# Patient Record
Sex: FEMALE | Race: WHITE | NOT HISPANIC OR LATINO | Employment: UNEMPLOYED | URBAN - METROPOLITAN AREA
[De-identification: names, ages, dates, MRNs, and addresses within clinical notes are randomized per-mention and may not be internally consistent; named-entity substitution may affect disease eponyms.]

---

## 2023-05-11 DIAGNOSIS — R51.9 FREQUENT HEADACHES: Primary | ICD-10-CM

## 2023-05-11 DIAGNOSIS — R41.3 MEMORY DEFICITS: ICD-10-CM

## 2023-05-12 DIAGNOSIS — R41.3 MEMORY DEFICIT: ICD-10-CM

## 2023-05-12 DIAGNOSIS — R51.9 FREQUENT HEADACHES: Primary | ICD-10-CM

## 2023-09-07 ENCOUNTER — OFFICE VISIT (OUTPATIENT)
Dept: NEUROLOGY | Facility: CLINIC | Age: 53
End: 2023-09-07
Payer: MEDICAID

## 2023-09-07 VITALS
WEIGHT: 138 LBS | OXYGEN SATURATION: 99 % | DIASTOLIC BLOOD PRESSURE: 84 MMHG | BODY MASS INDEX: 26.06 KG/M2 | SYSTOLIC BLOOD PRESSURE: 131 MMHG | HEIGHT: 61 IN | HEART RATE: 85 BPM

## 2023-09-07 DIAGNOSIS — R41.3 MEMORY DEFICIT: ICD-10-CM

## 2023-09-07 DIAGNOSIS — R51.9 FREQUENT HEADACHES: ICD-10-CM

## 2023-09-07 DIAGNOSIS — R40.20 LOSS OF CONSCIOUSNESS: Primary | ICD-10-CM

## 2023-09-07 PROCEDURE — 4010F ACE/ARB THERAPY RXD/TAKEN: CPT | Mod: CPTII,,, | Performed by: NURSE PRACTITIONER

## 2023-09-07 PROCEDURE — 3075F SYST BP GE 130 - 139MM HG: CPT | Mod: CPTII,,, | Performed by: NURSE PRACTITIONER

## 2023-09-07 PROCEDURE — 3008F BODY MASS INDEX DOCD: CPT | Mod: CPTII,,, | Performed by: NURSE PRACTITIONER

## 2023-09-07 PROCEDURE — 4010F PR ACE/ARB THEARPY RXD/TAKEN: ICD-10-PCS | Mod: CPTII,,, | Performed by: NURSE PRACTITIONER

## 2023-09-07 PROCEDURE — 3079F PR MOST RECENT DIASTOLIC BLOOD PRESSURE 80-89 MM HG: ICD-10-PCS | Mod: CPTII,,, | Performed by: NURSE PRACTITIONER

## 2023-09-07 PROCEDURE — 99213 OFFICE O/P EST LOW 20 MIN: CPT | Mod: PBBFAC | Performed by: NURSE PRACTITIONER

## 2023-09-07 PROCEDURE — 99205 PR OFFICE/OUTPT VISIT, NEW, LEVL V, 60-74 MIN: ICD-10-PCS | Mod: S$PBB,,, | Performed by: NURSE PRACTITIONER

## 2023-09-07 PROCEDURE — 1160F PR REVIEW ALL MEDS BY PRESCRIBER/CLIN PHARMACIST DOCUMENTED: ICD-10-PCS | Mod: CPTII,,, | Performed by: NURSE PRACTITIONER

## 2023-09-07 PROCEDURE — 1160F RVW MEDS BY RX/DR IN RCRD: CPT | Mod: CPTII,,, | Performed by: NURSE PRACTITIONER

## 2023-09-07 PROCEDURE — 1159F PR MEDICATION LIST DOCUMENTED IN MEDICAL RECORD: ICD-10-PCS | Mod: CPTII,,, | Performed by: NURSE PRACTITIONER

## 2023-09-07 PROCEDURE — 99205 OFFICE O/P NEW HI 60 MIN: CPT | Mod: S$PBB,,, | Performed by: NURSE PRACTITIONER

## 2023-09-07 PROCEDURE — 3079F DIAST BP 80-89 MM HG: CPT | Mod: CPTII,,, | Performed by: NURSE PRACTITIONER

## 2023-09-07 PROCEDURE — 3008F PR BODY MASS INDEX (BMI) DOCUMENTED: ICD-10-PCS | Mod: CPTII,,, | Performed by: NURSE PRACTITIONER

## 2023-09-07 PROCEDURE — 1159F MED LIST DOCD IN RCRD: CPT | Mod: CPTII,,, | Performed by: NURSE PRACTITIONER

## 2023-09-07 PROCEDURE — 3075F PR MOST RECENT SYSTOLIC BLOOD PRESS GE 130-139MM HG: ICD-10-PCS | Mod: CPTII,,, | Performed by: NURSE PRACTITIONER

## 2023-09-07 RX ORDER — SUMATRIPTAN SUCCINATE 25 MG/1
25 TABLET ORAL 2 TIMES DAILY PRN
Qty: 12 TABLET | Refills: 1 | Status: SHIPPED | OUTPATIENT
Start: 2023-09-07 | End: 2023-12-11

## 2023-09-07 RX ORDER — TIZANIDINE 2 MG/1
4 TABLET ORAL 2 TIMES DAILY PRN
Qty: 30 TABLET | Refills: 0 | Status: SHIPPED | OUTPATIENT
Start: 2023-09-07 | End: 2023-09-17

## 2023-09-07 RX ORDER — LOSARTAN POTASSIUM 50 MG/1
50 TABLET ORAL DAILY
COMMUNITY
Start: 2023-08-22 | End: 2023-12-11

## 2023-09-07 RX ORDER — GEMFIBROZIL 600 MG/1
600 TABLET, FILM COATED ORAL 2 TIMES DAILY
COMMUNITY
Start: 2023-08-22

## 2023-09-07 RX ORDER — AMITRIPTYLINE HYDROCHLORIDE 75 MG/1
75 TABLET ORAL NIGHTLY
COMMUNITY
Start: 2023-08-15

## 2023-09-07 NOTE — PROGRESS NOTES
"[facility] Neurology Initial Office Visit Note    Initial Visit Date: 09/07/2023  Last Visit Date:   Current Visit Date:  09/07/2023    Chief Complaint:     Chief Complaint   Patient presents with    Headache     Pt reports migraines have been present since 11/2022. Described as heavy, along with the sudden urge to sleep. Located behind eyes, or back of head. Tried and failed Elavil. 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 Changed glasses Rx thinking it could have been the cause. Ophtho agrees to f/u w/ Neuro.     History of Present Illness:      This is 53 y.o. female with history of sleep disturbance, scoliosis, sciatica, anxiety, who is referred headache disorder. Has also had issues w/zoning out while talking to family and began to talk about something different, only lasted a few minutes then joins conversation. No personal or family hx of seizures.    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 Back of head feels "heavy" prior to HA    Headache Description: back of head feels heavy, behind eyes, may radiate to temporal area, pounding/throbbing/pinching, denies N/V, admits to photophobia, worsened with activity, sleeps it off, does not take anything, will last a few hours. Last HA 2 weeks ago, improved with new glasses    Frequency: 2-3 headache days per month.     Provocation Factors: bending neck forward; sometimes lack of sleep    Risk Factors  - Family history of headache disorder: No  - History of focal CNS lesions: No  - History of CNS infections: No  - History head trauma: No  - History of underlying mood disorder: No  - History of sleep disorder: Yes insomnia; on Elavil  - Recreational drug use: No  - Tobacco use: No  - Alcohol use: No  - Weight " fluctuation: No  - Isotretinoin or Tetracycline use:  No  - Family planning and contraceptive use: No    Medications:     Current Prophylactic  Losartan 50mg daily - not effective for HA  Amitriptyline 50mg QHS - not effective for HA    Current Abortive  none    Prior Prophylactic  none    Prior Abortive  none    Devices:     - VNS:  - TNS  - TMS:     Procedures:     - Botox:  - PSG block:   - Occipital nerve block:     Labs:     No results found for this or any previous visit.    Studies:     - MRI Brain:   - MRA Head w/o Aman:   - MRV Head w/o Aman:   - NCHCT: 12/9/2022 - no acute intracranial findings  - Lumbar Puncture:    Review of Systems:     ROS    Physical Exams:     Vitals:    09/07/23 1315   BP: 131/84   Pulse: 85     Physical Exam  Constitutional:       Appearance: Normal appearance.   HENT:      Right Ear: External ear normal.      Left Ear: External ear normal.      Mouth/Throat:      Mouth: Mucous membranes are moist.      Pharynx: Oropharynx is clear.   Eyes:      Pupils: Pupils are equal, round, and reactive to light.   Cardiovascular:      Rate and Rhythm: Normal rate.      Pulses: Normal pulses.   Abdominal:      General: There is no distension.      Tenderness: There is no guarding.   Musculoskeletal:         General: Normal range of motion.      Cervical back: Normal range of motion.   Skin:     General: Skin is warm.      Findings: No lesion or rash.   Neurological:      General: No focal deficit present.      Mental Status: She is alert.   Psychiatric:         Mood and Affect: Mood normal.     Comprehensive Neurological Exam:  Mental Status: Alert Oriented to Self, Date, and Place. Naming, repetition, reading, and writing wnl. Comprehension wnl. No dysarthria.   CN II - XII: XAVIER, No APD, Fundus wnl OU, VA grossly intact to finger counting at 6 ft, VFFC, No ptosis OU, EOMI without nystagmus LT/Temp symmetric in CN V1-3 distribution, Hearing grossly intact, Face Symmetric, Tongue and Uvula  "midline, Trapezius symmetric bilateral.   Motor: tone and bulk wnl throughout, no abnormal involuntary or voluntary movements, 5/5 to confrontation, Fine finger movements wnl b/l, No pronator drift.   Sensory: LT, Proprioception, Vibration, PP, Temp symmetric. No sensory simultagnosia.   Reflexes: 2+ throughout, plantar reflexes downward bilateral.   Cerebellar: FNF wnl b/l, RAHM wnl b/l  Romberg: Negative  Gait: normal. Heel Gait, Toe Gait, Tandem Gait wnl.     Assessment:     This is 53 y.o. female with history of sleep disorder and anxiety, who is referred headache disorder. Also c/o issues with "zoning out" on occasion. Admits to neck tension while painting. Is aware when HA beginning as it starts in occipital area of head and may radiate to frontal area like a band. May last a few hours. Does not take anything OTC. Both started in November, denies any exacerbating factors.    Problem List Items Addressed This Visit    None  Visit Diagnoses       Loss of consciousness    -  Primary    Relevant Orders    EEG    Frequent headaches        Relevant Medications    sumatriptan (IMITREX) 25 MG Tab    tiZANidine (ZANAFLEX) 2 MG tablet    Memory deficit              Plan:     [] rEEG for LOC  [] start tizanidine 2mg BID PRN tension  [] sumatriptan 25mg PO BID PRN at onset of headache    RTC 3 mths    Headache education provided: good sleep hygiene and 7 hours of sleep per night, stress management, medication overuse education provided. Using more 3 OTC per week may worsen headaches, high intensity interval training has shown to reduce headache frequency. Low carb, high protein has shown to reduce headache frequency. Patient is instructed in keep headache diary.     I have explained the treatment plan, diagnosis, and prognosis to patient. All questions are answered to the best of my knowledge.     Face to face time 60 minutes, including documentation, chart review, counseling, education, review of test results, relevant " medical records, and coordination of care.   09/07/2023

## 2023-09-26 ENCOUNTER — PROCEDURE VISIT (OUTPATIENT)
Dept: NEUROLOGY | Facility: HOSPITAL | Age: 53
End: 2023-09-26
Attending: NURSE PRACTITIONER
Payer: MEDICAID

## 2023-09-26 DIAGNOSIS — R40.20 LOSS OF CONSCIOUSNESS: ICD-10-CM

## 2023-09-26 PROCEDURE — 95816 EEG AWAKE AND DROWSY: CPT

## 2023-09-26 PROCEDURE — 95819 PR EEG,W/AWAKE & ASLEEP RECORD: ICD-10-PCS | Mod: 26,,, | Performed by: PSYCHIATRY & NEUROLOGY

## 2023-09-26 PROCEDURE — 95819 EEG AWAKE AND ASLEEP: CPT | Mod: 26,,, | Performed by: PSYCHIATRY & NEUROLOGY

## 2023-09-26 NOTE — PROCEDURES
ROUTINE EEG    Date/Time: 9/26/2023 12:30 PM    Performed by: Kadie Park MD  Authorized by: Abbey Mcgraw ANP      Indication: Syncope    Clinical Information: This is a 53 year old female with lost of awareness events.     Anticonvulsants: None    Recording Condition: This is a routine electroencephalogram performed in outpatient settings using [employer] software. Electroencephalogram leads were placed using a 10-20 placement system. The electroencephalogram is monitored in real time by a technologist and is of good technical quality for review.     Technique: Electroencephalogram leads were placed using a 10-20 placement system and electrode impedance was maintained below 10KOhms. Truong and seizure detection computer program was used for digital EEG analysis throughout the monitoring period, to screen the EEG in real time and pascual the data file with pointers to electrographic seizure and interictal discharges. Hyperventilation was performed and Photic stimulation was performed.     Description:   Background Symmetry- Symmetric  Frequency - Beta and Alpha  Voltage - Normal   Continuity - Continuous  Anterior to Posterior Gradient - Present  Posterior Dominant Rhythm - 7 Hz   Reactivity - Unclear  State Changes - Present with normal sleep stage N2 transients  Breach Artifact - Absent    Cyclic Alternating Pattern of Encephalopathy (CAPE) - Absent  Sporadic Epileptiform Discharges - Absent  Rhythmic or Periodic Patterns (RPPs) - Absent    Electroclinical Seizures (ECSz): Absent  Electroclinical Status Epilepticus (ECSE): Absent  Electrographical Seizures (Esz): Absent    Briefly Potentially Ictal Rhythmic Discharges (BIRDs): Absent  Ictal-Interictal Continuum (IIC): Absent    Conclusion: This is a normal routine awake and asleep EEG.     Impression: This is a normal routine awake and asleep EEG. The possibility that the patient may have a potentially epileptogenic focus cannot be entirely excluded. There is  however no ongoing seizure activity, nor is there any evidence of status epilepticus in this study. Clinical correlation is advised.

## 2023-09-27 ENCOUNTER — TELEPHONE (OUTPATIENT)
Dept: NEUROLOGY | Facility: CLINIC | Age: 53
End: 2023-09-27
Payer: MEDICAID

## 2023-09-27 DIAGNOSIS — G43.701 CHRONIC MIGRAINE WITHOUT AURA WITH STATUS MIGRAINOSUS, NOT INTRACTABLE: Primary | ICD-10-CM

## 2023-09-27 RX ORDER — PROPRANOLOL HYDROCHLORIDE 10 MG/1
10 TABLET ORAL DAILY
Qty: 30 TABLET | Refills: 2 | Status: SHIPPED | OUTPATIENT
Start: 2023-09-27 | End: 2023-12-11

## 2023-09-27 NOTE — TELEPHONE ENCOUNTER
Called Pt and discussed EEG results. Denies any further black out spells. However notes increased in HA to 2-3/week due to increased stress and lack of sleep. Currently taking Elavil 75mg nightly, tizanidine more effective than sumatriptan. Will start propranolol 10mg daily. Is to check BP daily, call office for dizziness or return of black out spells and will order ambulatory EEG. Pt verbalized understanding.

## 2023-12-11 ENCOUNTER — OFFICE VISIT (OUTPATIENT)
Dept: NEUROLOGY | Facility: CLINIC | Age: 53
End: 2023-12-11
Payer: MEDICAID

## 2023-12-11 DIAGNOSIS — R51.9 FREQUENT HEADACHES: Primary | ICD-10-CM

## 2023-12-11 DIAGNOSIS — R40.20 LOSS OF CONSCIOUSNESS: ICD-10-CM

## 2023-12-11 PROCEDURE — 99214 PR OFFICE/OUTPT VISIT, EST, LEVL IV, 30-39 MIN: ICD-10-PCS | Mod: 95,,, | Performed by: NURSE PRACTITIONER

## 2023-12-11 PROCEDURE — 1159F PR MEDICATION LIST DOCUMENTED IN MEDICAL RECORD: ICD-10-PCS | Mod: CPTII,95,, | Performed by: NURSE PRACTITIONER

## 2023-12-11 PROCEDURE — 99214 OFFICE O/P EST MOD 30 MIN: CPT | Mod: 95,,, | Performed by: NURSE PRACTITIONER

## 2023-12-11 PROCEDURE — 4010F PR ACE/ARB THEARPY RXD/TAKEN: ICD-10-PCS | Mod: CPTII,95,, | Performed by: NURSE PRACTITIONER

## 2023-12-11 PROCEDURE — 1159F MED LIST DOCD IN RCRD: CPT | Mod: CPTII,95,, | Performed by: NURSE PRACTITIONER

## 2023-12-11 PROCEDURE — 1160F RVW MEDS BY RX/DR IN RCRD: CPT | Mod: CPTII,95,, | Performed by: NURSE PRACTITIONER

## 2023-12-11 PROCEDURE — 4010F ACE/ARB THERAPY RXD/TAKEN: CPT | Mod: CPTII,95,, | Performed by: NURSE PRACTITIONER

## 2023-12-11 PROCEDURE — 1160F PR REVIEW ALL MEDS BY PRESCRIBER/CLIN PHARMACIST DOCUMENTED: ICD-10-PCS | Mod: CPTII,95,, | Performed by: NURSE PRACTITIONER

## 2023-12-11 RX ORDER — SUMATRIPTAN 50 MG/1
50 TABLET, FILM COATED ORAL ONCE
COMMUNITY
Start: 2023-09-07 | End: 2023-12-11

## 2023-12-11 RX ORDER — LOSARTAN POTASSIUM AND HYDROCHLOROTHIAZIDE 12.5; 5 MG/1; MG/1
1 TABLET ORAL 2 TIMES DAILY
COMMUNITY
Start: 2023-12-06

## 2023-12-11 NOTE — PROGRESS NOTES
"[facility] Neurology Follow Up Office Visit Note    Initial Visit Date: 09/07/2023  Last Visit Date:   Current Visit Date:  12/11/2023    Chief Complaint:     Chief Complaint   Patient presents with    Headache     Pt states the headaches are much better. 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     History of Present Illness:      This is 53 y.o. female with history of sleep disturbance, scoliosis, sciatica, anxiety, who was referred headache disorder. Has also had issues w/zoning out while talking to family and began to talk about something different, only lasted a few minutes then joins conversation. No personal or family hx of seizures. During last visit, a routine EEG was ordered for LOC. Sumatriptan 25mg PO BID PRN and tizanidine 2mg PO BID PRN were initiated.    Today, Pt states headaches have improved. A slow gas leak was repaired. Rarely has headaches. Last headache a month ago. Denies any further episodes of LOC. No longer requires sumatriptan or tizanidine.    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 Back of head feels "heavy" prior to HA    Headache Description: back of head feels heavy, behind eyes, may radiate to temporal area, pounding/throbbing/pinching, denies N/V, admits to photophobia, worsened with activity, sleeps it off, does not take anything, will last a few hours. Last HA 2 weeks ago, improved with new glasses    Frequency: 2-3 headache days per month.     Provocation Factors: bending neck forward; sometimes lack of sleep    Risk Factors  - Family history of headache disorder: No  - History of focal CNS lesions: No  - History of CNS infections: No  - History head trauma: No  - History of underlying mood disorder: No  - History of sleep disorder: Yes insomnia; " on Elavil  - Recreational drug use: No  - Tobacco use: No  - Alcohol use: No  - Weight fluctuation: No  - Isotretinoin or Tetracycline use:  No  - Family planning and contraceptive use: No    Medications:     Current Prophylactic  Losartan 50mg daily - not effective for HA  Amitriptyline 50mg QHS - not effective for HA    Current Abortive  none    Prior Prophylactic  none    Prior Abortive  none    Devices:     - VNS:  - TNS  - TMS:     Procedures:     - Botox:  - PSG block:   - Occipital nerve block:     Labs:     No results found for this or any previous visit.    Studies:     - MRI Brain:   - MRA Head w/o Aman:   - MRV Head w/o Aman:   - NCHCT: 12/9/2022 - no acute intracranial findings  - Lumbar Puncture:  - rEEG 9/26/2023: this is a normal routine awake and asleep EEG (read by Dr. Park)    Review of Systems:     ROS  As per HPI. All other systems negative  Physical Exams:     There were no vitals filed for this visit.    Physical Exam  Constitutional:       Appearance: Normal appearance.   HENT:      Right Ear: External ear normal.      Left Ear: External ear normal.      Mouth/Throat:      Mouth: Mucous membranes are moist.      Pharynx: Oropharynx is clear.   Eyes:      Extraocular Movements: Extraocular movements intact.   Pulmonary:      Effort: Pulmonary effort is normal.   Abdominal:      General: Abdomen is flat. There is no distension.      Tenderness: There is no guarding.   Musculoskeletal:         General: Normal range of motion.      Cervical back: Normal range of motion.   Neurological:      General: No focal deficit present.      Mental Status: She is alert.   Psychiatric:         Mood and Affect: Mood normal.     Comprehensive Neurological Exam:  Mental Status: Alert Oriented to Self, Date, and Place. Naming, repetition, reading, and writing wnl. Comprehension wnl. No dysarthria.   CN II - XII: No ptosis OU, EOMI without nystagmus, Hearing grossly intact, Face Symmetric, Tongue and Uvula midline,  Trapezius symmetric bilateral.   Motor: tone and bulk wnl throughout, no abnormal involuntary or voluntary movements, no satelliting w/orbiting, Fine finger movements wnl b/l, No pronator drift.   Sensory: unable to assess due to nature of visit  Reflexes: unable to assess due to nature of visit  Cerebellar: FNF wnl b/l  Romberg: Negative  Gait: normal, bilat arm swing intact    Assessment:     This is 53 y.o. female with history of sleep disorder and anxiety, who was referred headache disorder. Pt states a gas leak was discovered and since repaired. Denies LOC or severe HA since repair. Does not require sumatriptan, beta blocker or tizanidine.     Problem List Items Addressed This Visit          Neuro    Frequent headaches - Primary    Loss of consciousness     Plan:     [] d/c tizanidine 2mg BID PRN tension  [] d/c sumatriptan 25mg PO BID PRN at onset of headache  [] may schedule appt if headache return    RTC PRN    Headache education provided: good sleep hygiene and 7 hours of sleep per night, stress management, medication overuse education provided. Using more 3 OTC per week may worsen headaches, high intensity interval training has shown to reduce headache frequency. Low carb, high protein has shown to reduce headache frequency. Patient is instructed in keep headache diary.     I have explained the treatment plan, diagnosis, and prognosis to patient. All questions are answered to the best of my knowledge.     Face to face time 30 minutes, including documentation, chart review, counseling, education, review of test results, relevant medical records, and coordination of care.   12/11/2023

## 2025-02-10 ENCOUNTER — TELEPHONE (OUTPATIENT)
Dept: NEUROLOGY | Facility: CLINIC | Age: 55
End: 2025-02-10
Payer: MEDICAID

## 2025-02-10 NOTE — TELEPHONE ENCOUNTER
----- Message from Dee sent at 2/10/2025  1:37 PM CST -----  Regarding: please advise  Pt is in need of a appt. Pt stated that she is having pain on the left side of her face. Pt went to see ENT Dr and was told to get cher with Neurologist per pt. Pt can be reached at [phone]    Thank You

## 2025-03-19 ENCOUNTER — LAB VISIT (OUTPATIENT)
Dept: LAB | Facility: HOSPITAL | Age: 55
End: 2025-03-19
Attending: NURSE PRACTITIONER
Payer: MEDICAID

## 2025-03-19 ENCOUNTER — OFFICE VISIT (OUTPATIENT)
Dept: NEUROLOGY | Facility: CLINIC | Age: 55
End: 2025-03-19
Payer: MEDICAID

## 2025-03-19 ENCOUNTER — PATIENT MESSAGE (OUTPATIENT)
Dept: NEUROLOGY | Facility: CLINIC | Age: 55
End: 2025-03-19

## 2025-03-19 VITALS
HEIGHT: 61 IN | TEMPERATURE: 98 F | HEART RATE: 78 BPM | DIASTOLIC BLOOD PRESSURE: 80 MMHG | SYSTOLIC BLOOD PRESSURE: 128 MMHG | OXYGEN SATURATION: 98 % | BODY MASS INDEX: 28.1 KG/M2 | RESPIRATION RATE: 18 BRPM | WEIGHT: 148.81 LBS

## 2025-03-19 DIAGNOSIS — G50.0 TRIGEMINAL NEURALGIA OF LEFT SIDE OF FACE: Chronic | ICD-10-CM

## 2025-03-19 DIAGNOSIS — G50.0 TRIGEMINAL NEURALGIA OF LEFT SIDE OF FACE: Primary | Chronic | ICD-10-CM

## 2025-03-19 LAB
ALBUMIN SERPL-MCNC: 4 G/DL (ref 3.5–5)
ALBUMIN/GLOB SERPL: 1.1 RATIO (ref 1.1–2)
ALP SERPL-CCNC: 83 UNIT/L (ref 40–150)
ALT SERPL-CCNC: 54 UNIT/L (ref 0–55)
ANION GAP SERPL CALC-SCNC: 7 MEQ/L
AST SERPL-CCNC: 27 UNIT/L (ref 5–34)
BILIRUB SERPL-MCNC: 0.3 MG/DL
BUN SERPL-MCNC: 13.5 MG/DL (ref 9.8–20.1)
CALCIUM SERPL-MCNC: 8.8 MG/DL (ref 8.4–10.2)
CHLORIDE SERPL-SCNC: 104 MMOL/L (ref 98–107)
CO2 SERPL-SCNC: 29 MMOL/L (ref 22–29)
CREAT SERPL-MCNC: 0.75 MG/DL (ref 0.55–1.02)
CREAT/UREA NIT SERPL: 18
CRP SERPL-MCNC: 4.3 MG/L
ERYTHROCYTE [SEDIMENTATION RATE] IN BLOOD: 4 MM/HR (ref 0–20)
GFR SERPLBLD CREATININE-BSD FMLA CKD-EPI: >60 ML/MIN/1.73/M2
GLOBULIN SER-MCNC: 3.7 GM/DL (ref 2.4–3.5)
GLUCOSE SERPL-MCNC: 92 MG/DL (ref 74–100)
POTASSIUM SERPL-SCNC: 3.3 MMOL/L (ref 3.5–5.1)
PROT SERPL-MCNC: 7.7 GM/DL (ref 6.4–8.3)
SODIUM SERPL-SCNC: 140 MMOL/L (ref 136–145)

## 2025-03-19 PROCEDURE — 85652 RBC SED RATE AUTOMATED: CPT

## 2025-03-19 PROCEDURE — 86140 C-REACTIVE PROTEIN: CPT

## 2025-03-19 PROCEDURE — 80053 COMPREHEN METABOLIC PANEL: CPT

## 2025-03-19 PROCEDURE — 36415 COLL VENOUS BLD VENIPUNCTURE: CPT

## 2025-03-19 PROCEDURE — 99213 OFFICE O/P EST LOW 20 MIN: CPT | Mod: PBBFAC | Performed by: NURSE PRACTITIONER

## 2025-03-19 RX ORDER — EZETIMIBE 10 MG/1
10 TABLET ORAL
COMMUNITY
Start: 2025-02-26

## 2025-03-19 RX ORDER — DEXAMETHASONE 4 MG/1
4 TABLET ORAL EVERY 12 HOURS
Qty: 10 TABLET | Refills: 0 | Status: SHIPPED | OUTPATIENT
Start: 2025-03-19 | End: 2025-03-24

## 2025-03-19 RX ORDER — LOSARTAN POTASSIUM AND HYDROCHLOROTHIAZIDE 12.5; 5 MG/1; MG/1
1 TABLET ORAL DAILY
COMMUNITY

## 2025-03-19 RX ORDER — LOSARTAN POTASSIUM 25 MG/1
TABLET ORAL
COMMUNITY
End: 2025-03-19

## 2025-03-19 RX ORDER — TIZANIDINE 2 MG/1
2 TABLET ORAL 2 TIMES DAILY PRN
Qty: 30 TABLET | Refills: 2 | Status: SHIPPED | OUTPATIENT
Start: 2025-03-19 | End: 2026-03-19

## 2025-03-19 RX ORDER — OXCARBAZEPINE 150 MG/1
150 TABLET, FILM COATED ORAL 2 TIMES DAILY
Qty: 60 TABLET | Refills: 4 | Status: SHIPPED | OUTPATIENT
Start: 2025-03-19 | End: 2026-03-19

## 2025-03-19 NOTE — PROGRESS NOTES
"[facility] Neurology Initial Office Visit Note    Initial Visit Date: 09/07/2023  Last Visit Date: 12/11/2023  Current Visit Date:  03/19/2025    Chief Complaint:     Chief Complaint   Patient presents with    Headache     Pt c/o L sided facial pain when headache occurs     History of Present Illness:      This is 54 y.o. female with hx of sleep disturbance, scoliosis, sciatica, anxiety, who was referred headache disorder. Has also had issues w/zoning out while talking to family and began to talk about something different, only lasted a few minutes then joins conversation. No personal or family hx of seizures. During last visit, a routine EEG was ordered for LOC. Sumatriptan 25mg PO BID PRN and tizanidine 2mg PO BID PRN were discontinued. Pt was instructed to f/u PRN. Recently visited w/Mukesh Luna MD for facial pain that started 4 mths ago.    Today, Pt states she began to began to have jaw pain and headache in September 2024. Has been evaluated by dentist, no dental issues. Pain starts in L tragus area that radiate to back to skull and to shooting pain to V2 and V3 areas, along tragus and behind ear, top of head. May be dull to sharp. May lasts for several days. Chewing on L side caused worsening. Denies visual changes. Started when she ate something that had a "bone" in it.     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 Back of head feels "heavy" prior to HA    Headache Description: back of head feels heavy, behind eyes, may radiate to temporal area, pounding/throbbing/pinching, denies N/V, admits to photophobia, worsened with activity, sleeps it off, does not take anything, will last a few hours. Last HA 2 weeks ago, improved with new glasses    Frequency:  daily tenderness; sharp pain 3 x weekly    Provocation Factors: chewing; wearing glasses    Risk " Factors  - Family history of headache disorder: No  - History of focal CNS lesions: No  - History of CNS infections: No  - History head trauma: No  - History of underlying mood disorder: No  - History of sleep disorder: Yes insomnia; on Elavil  - Recreational drug use: No  - Tobacco use: No  - Alcohol use: No  - Weight fluctuation: No  - Isotretinoin or Tetracycline use:  No  - Family planning and contraceptive use: No    Medications:     Current Prophylactic  Amitriptyline 75mg QHS (4/1/2024 - present) - not effective for HA  Losartan/HCTZ 50/12.5mg PO daily (4/10/2024 - present) ineffective    Current Abortive  none    Prior Prophylactic  none    Prior Abortive  none    Devices:     - VNS:  - TNS  - TMS:     Procedures:     - Botox:  - PSG block:   - Occipital nerve block:     Labs:     No results found for this or any previous visit.    Studies:     - MRI Brain:   - MRA Head w/o Aman:   - MRV Head w/o Aman:   - NCHCT: 12/9/2022 - no acute intracranial findings  - Lumbar Puncture:  - rEEG 9/26/2023: this is a normal routine awake and asleep EEG (read by Dr. Park)    Review of Systems:     ROS  As per HPI. All other systems negative  Physical Exams:     Vitals:    03/19/25 1103   BP: 128/80   Pulse: 78   Resp: 18   Temp: 98.1 °F (36.7 °C)     Physical Exam  Constitutional:       Appearance: Normal appearance.   HENT:      Right Ear: External ear normal.      Left Ear: External ear normal.      Mouth/Throat:      Mouth: Mucous membranes are moist.      Pharynx: Oropharynx is clear.   Eyes:      Extraocular Movements: Extraocular movements intact.   Pulmonary:      Effort: Pulmonary effort is normal.   Abdominal:      General: Abdomen is flat. There is no distension.      Tenderness: There is no guarding.   Musculoskeletal:         General: Normal range of motion.      Cervical back: Normal range of motion.   Neurological:      General: No focal deficit present.      Mental Status: She is alert.   Psychiatric:          "Mood and Affect: Mood normal.     Comprehensive Neurological Exam:  Mental Status: Alert Oriented to Self, Date, and Place. Naming, repetition, reading, and writing wnl. Comprehension wnl. No dysarthria.   CN II - XII: PERRL, no APD, No ptosis OU, VA grossly normal at 6 ft., EOMI without nystagmus, masseter symmetric bilaterally, Hearing grossly intact, V1-V3 intact with mild tenderness to V2 area where jaw and ear meet, Face Symmetric, Tongue and Uvula midline, Trapezius symmetric bilateral.   Motor: tone and bulk wnl throughout, no abnormal involuntary or voluntary movements, 5/5 to confrontation all ext, Fine finger movements wnl b/l, No pronator drift.   Sensory: LT/PP/vibration/temp equal all extremities  Reflexes: 2+ all ext  Cerebellar: FNF wnl b/l  Romberg: Negative  Gait: toe gait, heel gait and tandem gait WNL, bilat arm swing intact    Assessment:     This is 54 y.o.  female with hx of sleep disorder and anxiety, who was referred headache disorder. Pt last seen in 2023 where HA resolved after gas leak repair. Instructed to return PRN. Today, Pt c/o L sided facial pain that started in September 2024 in the setting of biting down on a "bone". Has been evaluated by dentist and ENT with no acute findings. Symptoms indicative of trigeminal neuralgia. Pain start at jaw/V2/ where jaw and ear meet and radiates to V2 and V3 areas, sometimes under and behind ear and top of head. Some days better than others. Exacerbating by chewing. Currently on Elavil 75mg nightly with no relief.     Problem List Items Addressed This Visit          Neuro    Trigeminal neuralgia of left side of face - Primary    Relevant Medications    OXcarbazepine (TRILEPTAL) 150 MG Tab    tiZANidine (ZANAFLEX) 2 MG tablet    dexAMETHasone (DECADRON) 4 MG Tab    Other Relevant Orders    Comprehensive metabolic panel    Comprehensive metabolic panel    C-Reactive Protein    Sedimentation rate       Plan:     [] OXC 150mg PO nightly x 1 " week then increase to BID if need  [] start tizanidine 2mg nightly PRN tension; may increase to BID or 2 tabs nightly if need  [] Rx for dexamethasone 4mg PO BID x 5 days  [] hold Ketorolac as she is allergic to Naproxen sodium  [] CMP, ESR, CRP today  [] CMP in 2 week at [facility] in [place]  [] request MRI brain from [facility]    RTC 3 mths - TM okay    Visit today is associated with current or anticipated ongoing medical care related to this patient's single serious condition/complex condition as documented above.     Headache education provided: good sleep hygiene and 7 hours of sleep per night, stress management, medication overuse education provided. Using more 3 OTC per week may worsen headaches, high intensity interval training has shown to reduce headache frequency. Low carb, high protein has shown to reduce headache frequency. Patient is instructed in keep headache diary.     I have explained the treatment plan, diagnosis, and prognosis to patient. All questions are answered to the best of my knowledge.     Face to face time 60 minutes, including documentation, chart review, counseling, education, review of test results, relevant medical records, and coordination of care.     Abbey Mcgraw, NP-C  General Neurology  03/19/2025

## 2025-03-31 ENCOUNTER — PATIENT MESSAGE (OUTPATIENT)
Dept: NEUROLOGY | Facility: CLINIC | Age: 55
End: 2025-03-31
Payer: MEDICAID

## 2025-06-11 ENCOUNTER — TELEPHONE (OUTPATIENT)
Dept: NEUROLOGY | Facility: CLINIC | Age: 55
End: 2025-06-11
Payer: MEDICAID